# Patient Record
Sex: MALE | Race: WHITE
[De-identification: names, ages, dates, MRNs, and addresses within clinical notes are randomized per-mention and may not be internally consistent; named-entity substitution may affect disease eponyms.]

---

## 2017-10-14 NOTE — UC
Neck Pain HPI





- HPI Summary


HPI Summary: 





TWO DAYS OF ACUTE ON CHRONIC RIGHT SIDED NECK PAIN. SPASMS AND PAIN IN RIGHT 

SIDED OF NECK RADIATE TO SHOULDER. JANNETTE WORKS IN [employer] AND OFTEN LIFTS 

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 NO FEVER. NO HEADACHE. NO KNOWN TRAUMA





- History of Current Complaint


Chief Complaint: UCBackPain


Stated Complaint: NECK PAIN


Time Seen by Provider: 10/14/17 14:50


Hx Obtained From: Patient


Onset/Duration Of Injury/Symptoms: Days


Mechanism Of Injury: No Known Trauma


Timing: Intermittent Episodes


Onset/Duration: Lasting Days, Still Present


Severity: Moderate


Location: Discrete At: - RIGHT NECK, Radiates To: - RIGHT SHOULDER


Character: Spasmotic


Aggravating Factors: Movement


Alleviating Factors: Nothing


Associated Signs & Symptoms: Positive: Negative





- Risk Factors


Meningitis Risk Factors: Negative





- Allergies/Home Medications


Allergies/Adverse Reactions: 


 Allergies











Allergy/AdvReac Type Severity Reaction Status Date / Time


 


No Known Allergies Allergy   Verified 10/14/17 14:32











Home Medications: 


 Home Medications





Acetaminophen [Eq Acetaminophen] 975 mg PO Q6H PRN 10/14/17 [History Confirmed 

10/14/17]


Ibuprofen [Ibuprofen 200 MG] 600 mg PO Q6H PRN 10/14/17 [History Confirmed 10/14

/17]











PMH/Surg Hx/FS Hx/Imm Hx


Previously Healthy: Yes





- Surgical History


Surgical History: Yes


Surgery Procedure, Year, and Place: ARTHROSCOPIC SURGERY LT KNEE 2000





- Family History


Known Family History: 


   Negative: Blood Disorder





- Social History


Occupation: Employed Full-time


Lives: With Family


Alcohol Use: Rare


Substance Use Type: None


Smoking Status (MU): Never Smoked Tobacco





- Immunization History


Most Recent Influenza Vaccination: none





Review Of Systems


Constitutional: Positive: Negative


Skin: Positive: Negative


Eyes: Positive: Negative


ENT: Positive: Negative


Respiratory: Positive: Negative


Cardiovascular: Positive: Negative


Gastrointestinal: Positive: Negative


Genitourinary: Positive: Negative


Musculoskeletal: Positive: Arthralgia, Myalgia


Neurological: Positive: Negative


Psychological: Positive: Negative


All Other Systems Reviewed And Are Negative: Yes





Physical Exam


Triage Information Reviewed: Yes


Appearance: Well-Appearing, No Pain Distress, Well-Nourished


Vital Signs: 


 Initial Vital Signs











Temp  97.3 F   10/14/17 14:27


 


Pulse  72   10/14/17 14:27


 


Resp  16   10/14/17 14:27


 


BP  136/76   10/14/17 14:27


 


Pulse Ox  100   10/14/17 14:27











Vital Signs Reviewed: Yes


Eye Exam: Normal


ENT Exam: Normal


ENT: Positive: Normal ENT inspection, Hearing grossly normal, TMs normal


Dental Exam: Normal


Neck exam: Normal


Neck: Positive: Supple, Nontender, No Lymphadenopathy


Respiratory Exam: Normal


Respiratory: Positive: Chest non-tender, Lungs clear, Normal breath sounds, No 

respiratory distress


Cardiovascular Exam: Normal


Cardiovascular: Positive: RRR, No Murmur


Abdominal Exam: Normal


Musculoskeletal: Positive: Strength Intact, ROM Intact, No Edema, Other: - PAIN 

RIGHT SIDED NECK WITH SPASM


Psychological Exam: Normal


Skin Exam: Normal





Neck Pain Course/Dx





- Differential Dx/Diagnosis


Differential Dx/HQI/PQRI: Sprain, Strain, Torticollis, Trauma


Provider Diagnoses: RIGHT SIDED TORTICOLLIS; DEGENERATIVE DISK DISEASE





Discharge





- Discharge Plan


Condition: Stable


Disposition: HOME


Prescriptions: 


HYDROcodone/ACETAMIN 5-325 MG* [Norco 5-325 TAB*] 1 tab PO Q8H PRN #15 tab MDD 

three tabs


 PRN Reason: Pain


Metaxalone TAB* [Skelaxin TAB*] 800 mg PO TID PRN #15 tab


 PRN Reason: Spasms


Patient Education Materials:  Cervical Strain (ED), Spasmodic Torticollis (ED), 

Degenerative Disc Disease (ED)


Referrals: 


Nathaniel Champagne MD [Primary Care Provider] - 


Additional Instructions: 


      PHYSICAL THERAPY REFERRAL: 


You have been prescribed physical therapy. Treatments may include stretching, 

exercise, application of heat or cold, and other modalities. After an injury, 

PT can reduce swelling and pain. In recovery, PT is used to restore mobility 

and strength. Your specific treatment goals are:


___x__ Reduction of Swelling (EGS, US, ice as needed)


___x__ Pain Reduction (EGS, US, ice as needed)


_____ TENS Pack Fitting and Instruction


_____ Wound Hydrotherapy


___x__ Preservation of Mobility


___x__ Restoration of Mobility


__x___ Strength Restoration


__x___ Work or Sports Hardening


This instruction sheet also serves as your PHYSICAL THERAPY REFERRAL! 


Please take it with you to the therapist, so he/she will be aware of your 

diagnosis and treatment plan.


You may see the physical therapist of your choice for these treatments, but may 

wish to check with your insurance to be sure the provider you select is covered.


It's important to see the doctor to whom you have been referred for follow up.

## 2017-10-14 NOTE — RAD
INDICATION:  Acute on chronic neck pain.



COMPARISON:  There are no prior studies available for comparison.



TECHNIQUE:  5 views of the cervical spine were obtained including lateral, oblique, AP,

open-mouth odontoid views.



FINDINGS: C1-C7 are visualized. There is straightening of the cervical spine with loss of

the normal cervical lordosis. No prevertebral soft tissue swelling or fracture is seen.



There is mild to moderate disc space narrowing and uncinate process spurring present at

the C3-C4, C4-C5, C5-C6 and C6-C7 levels. There is mild bilateral neural foraminal

narrowing at the C4-C5, C5-C6 and C6-C7 levels.



IMPRESSION:  

1. STRAIGHTENING OF THE CERVICAL SPINE.

2. MILD TO MODERATE DEGENERATIVE DISC DISEASE.

## 2019-01-10 ENCOUNTER — HOSPITAL ENCOUNTER (EMERGENCY)
Dept: HOSPITAL 25 - ED | Age: 44
Discharge: HOME | End: 2019-01-10
Payer: COMMERCIAL

## 2019-01-10 VITALS — DIASTOLIC BLOOD PRESSURE: 79 MMHG | SYSTOLIC BLOOD PRESSURE: 131 MMHG
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
## 2019-01-10 DIAGNOSIS — S16.1XXA: Primary | ICD-10-CM
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
## 2019-01-10 DIAGNOSIS — M54.2: ICD-10-CM

## 2019-01-10 PROCEDURE — 72125 CT NECK SPINE W/O DYE: CPT

## 2019-01-10 PROCEDURE — 99282 EMERGENCY DEPT VISIT SF MDM: CPT
